# Patient Record
Sex: FEMALE | Race: WHITE | NOT HISPANIC OR LATINO | Employment: FULL TIME | URBAN - METROPOLITAN AREA
[De-identification: names, ages, dates, MRNs, and addresses within clinical notes are randomized per-mention and may not be internally consistent; named-entity substitution may affect disease eponyms.]

---

## 2018-03-03 ENCOUNTER — APPOINTMENT (OUTPATIENT)
Dept: RADIOLOGY | Facility: IMAGING CENTER | Age: 23
End: 2018-03-03
Attending: NURSE PRACTITIONER
Payer: COMMERCIAL

## 2018-03-03 ENCOUNTER — OFFICE VISIT (OUTPATIENT)
Dept: URGENT CARE | Facility: CLINIC | Age: 23
End: 2018-03-03
Payer: COMMERCIAL

## 2018-03-03 VITALS
HEIGHT: 64 IN | DIASTOLIC BLOOD PRESSURE: 70 MMHG | HEART RATE: 72 BPM | TEMPERATURE: 97.7 F | BODY MASS INDEX: 30.39 KG/M2 | SYSTOLIC BLOOD PRESSURE: 122 MMHG | RESPIRATION RATE: 16 BRPM | OXYGEN SATURATION: 96 % | WEIGHT: 178 LBS
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
## 2018-03-03 DIAGNOSIS — M54.2 CERVICAL PAIN (NECK): ICD-10-CM

## 2018-03-03 DIAGNOSIS — M54.9 UPPER BACK PAIN: ICD-10-CM

## 2018-03-03 DIAGNOSIS — M62.838 NECK MUSCLE SPASM: ICD-10-CM

## 2018-03-03 PROCEDURE — 99204 OFFICE O/P NEW MOD 45 MIN: CPT | Performed by: NURSE PRACTITIONER

## 2018-03-03 PROCEDURE — 72040 X-RAY EXAM NECK SPINE 2-3 VW: CPT | Mod: TC,FY | Performed by: NURSE PRACTITIONER

## 2018-03-03 RX ORDER — METHYLPREDNISOLONE 4 MG/1
4 TABLET ORAL DAILY
Qty: 1 KIT | Refills: 0 | Status: SHIPPED | OUTPATIENT
Start: 2018-03-03

## 2018-03-03 RX ORDER — CYCLOBENZAPRINE HCL 5 MG
5-10 TABLET ORAL 3 TIMES DAILY PRN
Qty: 30 TAB | Refills: 0 | Status: SHIPPED | OUTPATIENT
Start: 2018-03-03

## 2018-03-03 ASSESSMENT — ENCOUNTER SYMPTOMS
VOMITING: 0
SORE THROAT: 0
HEADACHES: 0
MYALGIAS: 0
DIZZINESS: 0
BACK PAIN: 1
NECK PAIN: 1
FEVER: 0
CHILLS: 0
EYE PAIN: 0
SHORTNESS OF BREATH: 0
PARESIS: 0
NAUSEA: 0
SYNCOPE: 0
NUMBNESS: 0
WEAKNESS: 0
PHOTOPHOBIA: 0

## 2018-03-03 ASSESSMENT — PATIENT HEALTH QUESTIONNAIRE - PHQ9: CLINICAL INTERPRETATION OF PHQ2 SCORE: 0

## 2018-03-03 ASSESSMENT — PAIN SCALES - GENERAL: PAINLEVEL: 5=MODERATE PAIN
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
## 2018-03-08 ENCOUNTER — OFFICE VISIT (OUTPATIENT)
Dept: URGENT CARE | Facility: CLINIC | Age: 23
End: 2018-03-08
Payer: COMMERCIAL

## 2018-03-08 VITALS
RESPIRATION RATE: 20 BRPM | OXYGEN SATURATION: 98 % | HEART RATE: 76 BPM | BODY MASS INDEX: 30.39 KG/M2 | WEIGHT: 178 LBS | SYSTOLIC BLOOD PRESSURE: 120 MMHG | TEMPERATURE: 98.8 F | HEIGHT: 64 IN | DIASTOLIC BLOOD PRESSURE: 80 MMHG

## 2018-03-08 DIAGNOSIS — S29.019A ACUTE THORACIC MYOFASCIAL STRAIN, INITIAL ENCOUNTER: ICD-10-CM
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
## 2018-03-08 PROCEDURE — 99214 OFFICE O/P EST MOD 30 MIN: CPT | Performed by: FAMILY MEDICINE

## 2018-03-08 RX ORDER — TRAMADOL HYDROCHLORIDE 50 MG/1
50 TABLET ORAL EVERY 8 HOURS PRN
Qty: 10 TAB | Refills: 0 | Status: SHIPPED | OUTPATIENT
Start: 2018-03-08 | End: 2018-03-13
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
## 2018-03-15 ENCOUNTER — PHYSICAL THERAPY (OUTPATIENT)
Dept: PHYSICAL THERAPY | Facility: REHABILITATION | Age: 23
End: 2018-03-15
Attending: FAMILY MEDICINE
Payer: COMMERCIAL
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
## 2018-03-15 DIAGNOSIS — M54.50 ACUTE MIDLINE LOW BACK PAIN WITHOUT SCIATICA: ICD-10-CM

## 2018-03-15 DIAGNOSIS — S29.019A ACUTE THORACIC MYOFASCIAL STRAIN, INITIAL ENCOUNTER: ICD-10-CM

## 2018-03-15 DIAGNOSIS — M54.2 NECK PAIN: ICD-10-CM

## 2018-03-15 PROCEDURE — 97162 PT EVAL MOD COMPLEX 30 MIN: CPT

## 2018-03-15 PROCEDURE — 97014 ELECTRIC STIMULATION THERAPY: CPT

## 2018-03-15 ASSESSMENT — ENCOUNTER SYMPTOMS
PAIN SCALE AT LOWEST: 2
PAIN TIMING: WHEN ACTIVE
QUALITY: BURNING
EXACERBATED BY: BENDING
PAIN TIMING: IN THE EVENING
QUALITY: ACHING
EXACERBATED BY: SITTING
PAIN SCALE: 2
PAIN LOCATION: NECK AND BACK
QUALITY: THROBBING
EXACERBATED BY: LIFTING
PAIN SCALE AT HIGHEST: 5
ALLEVIATING FACTORS: NOTHING
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
## 2018-03-29 ENCOUNTER — PHYSICAL THERAPY (OUTPATIENT)
Dept: PHYSICAL THERAPY | Facility: REHABILITATION | Age: 23
End: 2018-03-29
Attending: FAMILY MEDICINE
Payer: COMMERCIAL

## 2018-03-29 DIAGNOSIS — M54.2 NECK PAIN: ICD-10-CM
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
## 2018-03-29 DIAGNOSIS — S29.019A ACUTE THORACIC MYOFASCIAL STRAIN, INITIAL ENCOUNTER: ICD-10-CM

## 2018-03-29 PROCEDURE — 97140 MANUAL THERAPY 1/> REGIONS: CPT

## 2018-03-29 PROCEDURE — 97014 ELECTRIC STIMULATION THERAPY: CPT

## 2018-03-29 PROCEDURE — 97110 THERAPEUTIC EXERCISES: CPT

## 2018-03-29 NOTE — OP THERAPY DAILY TREATMENT
Outpatient Physical Therapy  DAILY TREATMENT     [facility] Physical [address]  [address]  [place] [address]  Phone:  [phone]  Fax:  [phone]    Date: 03/29/2018    Patient: Joshua Ansari  YOB: 1995  MRN: [id number]     Time Calculation  Start time: 1130  Stop time: 1215 Time Calculation (min): 45 minutes     Chief Complaint: Neck Pain    Visit #: 2    SUBJECTIVE:  My pain is mostly better    OBJECTIVE:          Therapeutic Exercises (CPT 66852):     1. Foam roller , pec stretch, alt GH flexion, seals, serratus press     2. Upper trap stretch , 30 sec on each side     3. Levator scap stretch , 30 sec     Therapeutic Treatments and Modalities:     1. Manual Therapy (CPT 95706), PA C5-C7, t/s mobs, manual traction, OA release     2. E Stim Unattended (CPT 54271), IFC and heat to c/s spine x 15 min    Time-based treatments/modalities:  Manual therapy minutes (CPT 84179): 10 minutes  Therapeutic exercise minutes (CPT 62310): 20 minutes       Pain rating before treatment: 1  Pain rating after treatment: 0    ASSESSMENT:   Response to treatment: Patient is reporting decreased overall pain   PLAN/RECOMMENDATIONS:   Plan for treatment: therapy treatment to continue next visit.  Planned interventions for next visit: continue with current treatment.  Add deep c/s strengthening.

## 2018-04-03 ENCOUNTER — PHYSICAL THERAPY (OUTPATIENT)
Dept: PHYSICAL THERAPY | Facility: REHABILITATION | Age: 23
End: 2018-04-03
Attending: FAMILY MEDICINE
Payer: COMMERCIAL
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
## 2018-04-03 DIAGNOSIS — M54.2 NECK PAIN: ICD-10-CM

## 2018-04-03 DIAGNOSIS — M54.50 ACUTE MIDLINE LOW BACK PAIN WITHOUT SCIATICA: ICD-10-CM

## 2018-04-03 DIAGNOSIS — S29.019A ACUTE THORACIC MYOFASCIAL STRAIN, INITIAL ENCOUNTER: ICD-10-CM

## 2018-04-03 PROCEDURE — 97110 THERAPEUTIC EXERCISES: CPT

## 2018-04-03 PROCEDURE — 97014 ELECTRIC STIMULATION THERAPY: CPT

## 2018-04-03 NOTE — OP THERAPY DAILY TREATMENT
Outpatient Physical Therapy  DAILY TREATMENT     [facility] Physical Therapy [address]  [address]  [place] [address]  Phone:  [phone]  Fax:  [phone]    Date: 04/03/2018    Patient: Joshua Ansari  YOB: 1995  MRN: [id number]     Time Calculation  Start time: 1456  Stop time: 1548 Time Calculation (min): 52 minutes     Chief Complaint: Back Problem    Visit #: 3    SUBJECTIVE:  Reports she has been feeling better after PT sessions.  Relief from last session lasted about 2 days.  Does try to stretch it out herself, which sometimes helps.  Today woke up achy and has worsened through the day with work activity.     OBJECTIVE:        Therapeutic Exercises (CPT 20719):     1. UBE, 4 min alternating.     2. Roller, diaphragm breath, alt GHJ flex, prot/ret, pec stretch, thread the needle.     3. Peanut ball review of self tx to upper t/s and lower c/s on wall and on table, x 10 min    4. Rows, L2 x 20    Therapeutic Treatments and Modalities:     2. E Stim Unattended (CPT 85792), IFC and heat to CTJ and upper thoracic spine x 15 min    Time-based treatments/modalities:  Therapeutic exercise minutes (CPT 18703): 30 minutes       Pain rating before treatment: 4  Pain rating after treatment: 2    ASSESSMENT:   Response to treatment: Good relief reported with self tx techniques.  She states preferring the peanut ball over the roller and plans to purchase some for home.  Considering TENS unit as well.     PLAN/RECOMMENDATIONS:   Plan for treatment: therapy treatment to continue next visit.  Planned interventions for next visit: continue with current treatment.

## 2018-04-05 ENCOUNTER — PHYSICAL THERAPY (OUTPATIENT)
Dept: PHYSICAL THERAPY | Facility: REHABILITATION | Age: 23
End: 2018-04-05
Attending: FAMILY MEDICINE
Payer: COMMERCIAL

## 2018-04-05 DIAGNOSIS — M54.50 ACUTE MIDLINE LOW BACK PAIN WITHOUT SCIATICA: ICD-10-CM

## 2018-04-05 DIAGNOSIS — M54.2 NECK PAIN: ICD-10-CM

## 2018-04-05 DIAGNOSIS — S29.019A ACUTE THORACIC MYOFASCIAL STRAIN, INITIAL ENCOUNTER: ICD-10-CM
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
## 2018-04-05 PROCEDURE — 97110 THERAPEUTIC EXERCISES: CPT

## 2018-04-05 NOTE — OP THERAPY DAILY TREATMENT
"  Outpatient Physical Therapy  DAILY TREATMENT     [facility] Physical [address].  [address]  [place] [address]  Phone:  [phone]  Fax:  [phone]    Date: 04/05/2018    Patient: Joshua Ansari  YOB: 1995  MRN: [id number]     Time Calculation  Start time: 1129  Stop time: 1200 Time Calculation (min): 31 minutes     Chief Complaint: Neck Problem and Back Problem    Visit #: 4    SUBJECTIVE:  The pain is \"a lot better\" after last session.  Has not gotten a peanut ball for home, but plans to.      OBJECTIVE:        Therapeutic Exercises (CPT 79222):     1. UBE, 4 min alternating.     2. Peanut ball review of self tx to upper t/s and lower c/s on wall and on table, x 10 min total    3. DNF lift off, 6c6\" holds    4. Prone kathrine, to fatigue x 2 sets, x13 and x 13    5. [place] with balloon c/s stab, L2 x 25      Therapeutic Exercise Summary: Declined IFC today, \"feeling pretty good\"      Time-based treatments/modalities:  Therapeutic exercise minutes (CPT 48109): 31 minutes       Pain rating before treatment: 1/10 L cervical.     ASSESSMENT:   Response to treatment: Pain levels low today with excellent response to self mobilizations.  Able to initiate stability/postural work.     PLAN/RECOMMENDATIONS:   Plan for treatment: therapy treatment to continue next visit.  Planned interventions for next visit: continue with current treatment.      "

## 2018-04-10 ENCOUNTER — PHYSICAL THERAPY (OUTPATIENT)
Dept: PHYSICAL THERAPY | Facility: REHABILITATION | Age: 23
End: 2018-04-10
Attending: FAMILY MEDICINE
Payer: COMMERCIAL

## 2018-04-10 DIAGNOSIS — M54.2 NECK PAIN: ICD-10-CM

## 2018-04-10 DIAGNOSIS — S29.019A ACUTE THORACIC MYOFASCIAL STRAIN, INITIAL ENCOUNTER: ICD-10-CM

## 2018-04-10 PROCEDURE — 97110 THERAPEUTIC EXERCISES: CPT

## 2018-04-10 NOTE — OP THERAPY DAILY TREATMENT
Outpatient Physical Therapy  DAILY TREATMENT     [facility] Physical Therapy [address].  [address]  [place] [address]  Phone:  [phone]  Fax:  [phone]    Date: 04/10/2018    Patient: Joshua Ansari  YOB: 1995  MRN: [id number]     Time Calculation  Start time: 1200  Stop time: 1230 Time Calculation (min): 30 minutes     Chief Complaint: Neck Problem    Visit #: 5    SUBJECTIVE:  I got a stim unit and I use it at home only occasionally as needed.     OBJECTIVE:      Therapeutic Exercises (CPT 24217):     1. [place] holds on wall, 10 sec x 10    2. [place] Lower trap wall slides , 15x    3. Horizontal abduction , L1 Band 15x    4. Tennis balls on wall with t/s ext above ball     5. Median nerve stretch , 5x    6. Supine pec stretch with diaghramtic breathing       Time-based treatments/modalities:  Therapeutic exercise minutes (CPT 73010): 28 minutes       Pain rating before treatment: 1  Pain rating after treatment: 1    ASSESSMENT:  Response to treatment: Patient reports she is doing much better overall.      PLAN/RECOMMENDATIONS:   Plan for treatment: therapy treatment to continue next visit.  Planned interventions for next visit: continue with current treatment.Follow up in 1 week to assess patient and determine plan of care.

## 2018-04-12 ENCOUNTER — APPOINTMENT (OUTPATIENT)
Dept: PHYSICAL THERAPY | Facility: REHABILITATION | Age: 23
End: 2018-04-12
Attending: FAMILY MEDICINE
Payer: COMMERCIAL

## 2018-04-17 ENCOUNTER — PHYSICAL THERAPY (OUTPATIENT)
Dept: PHYSICAL THERAPY | Facility: REHABILITATION | Age: 23
End: 2018-04-17
Attending: FAMILY MEDICINE
Payer: COMMERCIAL

## 2018-04-17 DIAGNOSIS — M54.2 NECK PAIN: ICD-10-CM

## 2018-04-17 DIAGNOSIS — S29.019A ACUTE THORACIC MYOFASCIAL STRAIN, INITIAL ENCOUNTER: ICD-10-CM

## 2018-04-17 PROCEDURE — 97110 THERAPEUTIC EXERCISES: CPT

## 2018-04-17 NOTE — OP THERAPY DAILY TREATMENT
Outpatient Physical Therapy  DAILY TREATMENT     [facility] Physical [address].  [address]  [place] [address]  Phone:  [phone]  Fax:  [phone]    Date: 04/17/2018    Patient: Joshua Ansari  YOB: 1995  MRN: [id number]     Time Calculation  Start time: 1500  Stop time: 1530 Time Calculation (min): 30 minutes     Chief Complaint: Neck Pain    Visit #: 6    SUBJECTIVE:  I am feeling better overall.     OBJECTIVE:  Current objective measures: Full AROM of cervical spine     Therapeutic Exercises (CPT 76254):     1. [place] holds on wall, 10 sec x 10    2. [place] Lower trap wall slides with L2 band , 15x 2     3. Horizontal abduction on half foam roller , L1 Band 15x    4. Tennis balls on wall with t/s ext above ball     5. Median nerve stretch , 5x    6. 1/2 foam roll stretch , pec stretch, alternating GH flexion, seals, serratus punch    7. UBE x 4 minutes , 2 min each direction      Time-based treatments/modalities:  Therapeutic exercise minutes (CPT 01118): 28 minutes       Pain rating before treatment: 0  Pain rating after treatment: 0    ASSESSMENT:   Response to treatment: patient reports she is managing her pain with TENS and exercises at home.     PLAN/RECOMMENDATIONS:   Plan for treatment: therapy treatment to continue next visit.  Planned interventions for next visit: continue with current treatment.Follow up if needed in 7-9 days.  If patient cancels appt. Then keep chart open x 30days.

## 2018-04-24 ENCOUNTER — PHYSICAL THERAPY (OUTPATIENT)
Dept: PHYSICAL THERAPY | Facility: REHABILITATION | Age: 23
End: 2018-04-24
Attending: FAMILY MEDICINE
Payer: COMMERCIAL

## 2018-04-24 DIAGNOSIS — S29.019A ACUTE THORACIC MYOFASCIAL STRAIN, INITIAL ENCOUNTER: ICD-10-CM

## 2018-04-24 DIAGNOSIS — M54.2 NECK PAIN: ICD-10-CM

## 2018-04-24 PROCEDURE — 97110 THERAPEUTIC EXERCISES: CPT

## 2018-04-24 NOTE — OP THERAPY DAILY TREATMENT
Outpatient Physical Therapy  DAILY TREATMENT     [facility] [address].  [address]  [place] [address]  Phone:  [phone]  Fax:  [phone]    Date: 04/24/2018    Patient: Joshua Ansari  YOB: 1995  MRN: [id number]     Time Calculation  Start time: 1500  Stop time: 1530 Time Calculation (min): 30 minutes     Chief Complaint: Neck Pain    Visit #: 7    SUBJECTIVE:  I am better overall, but I think I tweaked something. It was kind of bad on Friday, then went away over the weekend.      OBJECTIVE:    Therapeutic Exercises (CPT 73960):     1. Summerland holds on wall, 10 sec x 10    2. Maual traction     3. Pinkie ball     4. Tennis balls on wall with t/s ext above ball     5. Ist rib mob with sheet    6. Wall slides , 10x    7. UBE x 4 minutes , 2 min each direction    8. Trunk rotation with stick for t/s extension, 10x      Time-based treatments/modalities:    Manual therapy minutes (CPT 15101): 5 minutes  Therapeutic exercise minutes (CPT 93246): 25 minutes  Pain rating before treatment: 2  Pain rating after treatment: 0    ASSESSMENT:   Response to treatment: Patient reports decreased overall tightness and pain after treatment.     PLAN/RECOMMENDATIONS:   Plan for treatment: therapy treatment to continue next visit.  Planned interventions for next visit: continue with current treatment. Patient to follow up with appt in 2-3 weeks if needed.

## 2018-05-07 ENCOUNTER — TELEPHONE (OUTPATIENT)
Dept: PHYSICAL THERAPY | Facility: REHABILITATION | Age: 23
End: 2018-05-07

## 2018-05-07 NOTE — OP THERAPY DISCHARGE SUMMARY
Outpatient Physical Therapy  DISCHARGE SUMMARY NOTE      [facility] [address].  [address]  [place] [address]  Phone:  [phone]  Fax:  [phone]    Date of Visit: 05/07/2018    Patient: Joshua Ansari  YOB: 1995  MRN: [id number]     Referring Provider: Shereen Ag D.O.   Referring Diagnosis C/s pain and t/s pain     Physical Therapy Occurrence Codes    Date physical therapy care plan established or reviewed:  3/15/18   Date physical therapy treatment started:  3/15/18          Your patient is being discharged from Physical Therapy with the following comments:   · Goals met    Comments:  Patient reports she had very little neck pain and thoracic pain and was able to manage her symptoms independently.        Recommendations:  Patient has extensive HEP and has been very compliant.      Sherri Arboleda, PT, DPT    Date: 5/7/2018

## 2018-05-08 ENCOUNTER — APPOINTMENT (OUTPATIENT)
Dept: PHYSICAL THERAPY | Facility: REHABILITATION | Age: 23
End: 2018-05-08
Attending: FAMILY MEDICINE
Payer: COMMERCIAL

## 2018-10-17 ENCOUNTER — HOSPITAL ENCOUNTER (OUTPATIENT)
Dept: RADIOLOGY | Facility: MEDICAL CENTER | Age: 23
End: 2018-10-17
Attending: PHYSICIAN ASSISTANT
Payer: COMMERCIAL

## 2018-10-17 DIAGNOSIS — M25.512 LEFT SHOULDER PAIN, UNSPECIFIED CHRONICITY: ICD-10-CM

## 2018-10-17 PROCEDURE — 77002 NEEDLE LOCALIZATION BY XRAY: CPT | Mod: LT

## 2018-10-17 PROCEDURE — 700117 HCHG RX CONTRAST REV CODE 255: Performed by: PHYSICIAN ASSISTANT

## 2018-10-17 PROCEDURE — 73222 MRI JOINT UPR EXTREM W/DYE: CPT | Mod: LT

## 2018-10-17 PROCEDURE — A9585 GADOBUTROL INJECTION: HCPCS | Performed by: PHYSICIAN ASSISTANT

## 2018-10-17 PROCEDURE — 700101 HCHG RX REV CODE 250

## 2018-10-17 RX ORDER — GADOBUTROL 604.72 MG/ML
1 INJECTION INTRAVENOUS ONCE
Status: COMPLETED | OUTPATIENT
Start: 2018-10-17 | End: 2018-10-17

## 2018-10-17 RX ORDER — LIDOCAINE HYDROCHLORIDE 10 MG/ML
INJECTION, SOLUTION INFILTRATION; PERINEURAL
Status: DISPENSED
Start: 2018-10-17 | End: 2018-10-17

## 2018-10-17 RX ADMIN — IOHEXOL 2 ML: 300 INJECTION, SOLUTION INTRAVENOUS at 09:55

## 2018-10-17 RX ADMIN — GADOBUTROL 1 ML: 604.72 INJECTION INTRAVENOUS at 09:55

## 2018-11-06 ENCOUNTER — HOSPITAL ENCOUNTER (OUTPATIENT)
Facility: MEDICAL CENTER | Age: 23
End: 2018-11-06
Attending: OBSTETRICS & GYNECOLOGY
Payer: COMMERCIAL

## 2018-11-06 ENCOUNTER — GYNECOLOGY VISIT (OUTPATIENT)
Dept: OBGYN | Facility: MEDICAL CENTER | Age: 23
End: 2018-11-06
Payer: COMMERCIAL

## 2018-11-06 VITALS
BODY MASS INDEX: 28.68 KG/M2 | SYSTOLIC BLOOD PRESSURE: 114 MMHG | DIASTOLIC BLOOD PRESSURE: 70 MMHG | HEIGHT: 64 IN | WEIGHT: 168 LBS

## 2018-11-06 DIAGNOSIS — N93.9 ABNORMAL UTERINE BLEEDING: ICD-10-CM

## 2018-11-06 DIAGNOSIS — L68.0 HIRSUTISM: ICD-10-CM

## 2018-11-06 DIAGNOSIS — Z11.3 ROUTINE SCREENING FOR STI (SEXUALLY TRANSMITTED INFECTION): ICD-10-CM

## 2018-11-06 DIAGNOSIS — Z30.019 ENCOUNTER FOR INITIAL PRESCRIPTION OF CONTRACEPTIVES, UNSPECIFIED CONTRACEPTIVE: ICD-10-CM

## 2018-11-06 DIAGNOSIS — L70.9 ACNE, UNSPECIFIED ACNE TYPE: ICD-10-CM

## 2018-11-06 PROCEDURE — 87591 N.GONORRHOEAE DNA AMP PROB: CPT

## 2018-11-06 PROCEDURE — 87491 CHLMYD TRACH DNA AMP PROBE: CPT

## 2018-11-06 PROCEDURE — 99204 OFFICE O/P NEW MOD 45 MIN: CPT | Performed by: OBSTETRICS & GYNECOLOGY

## 2018-11-06 RX ORDER — CITALOPRAM 20 MG/1
20 TABLET ORAL DAILY
COMMUNITY
Start: 2018-10-26

## 2018-11-06 RX ORDER — TRAZODONE HYDROCHLORIDE 50 MG/1
25 TABLET ORAL
COMMUNITY
Start: 2018-09-26

## 2018-11-06 RX ORDER — ACETAMINOPHEN AND CODEINE PHOSPHATE 120; 12 MG/5ML; MG/5ML
1 SOLUTION ORAL DAILY
Qty: 28 TAB | Refills: 11 | Status: SHIPPED | OUTPATIENT
Start: 2018-11-06

## 2018-11-06 NOTE — PROGRESS NOTES
GYN Visit    CC: change in menstrual ccle    HPI: Joshua Ansari is a 23 y.o.  here to discuss her menstrual cycle; also reprots family hx of PCOS and would like to be evaluated for this.    Currently using condoms for contraception; has used ZAHRA in the past.  Reports hx of DVT in father (related to surgery, unsure if inherited thrombophilia workup was neg, no lifelong anticoagulation)    Pt reports she has always had painful periods, crampy; used ot start a week before menses and not too bad.  Reports her cramping now starts day of period and is very painful. Tries to take midol but doesn't help, heating pad helps some.  Cannot take ibuprofen (swelling) but OK with naproxen.    Feels like her flow has changed, lighter than used to be but is darker in color and some clots.  Max 4 pads or tampons or day.   This change started /July    Patient's last menstrual period was 10/13/2018 (exact date).  Last period had spotting about 1 week after menses which lasted a week. Only pain with bleeding, no pain when not having vaginal bleeding.  This is the first time this has ever happened, never had intermenstrual bleeding or skipped menses in the past.  Did take a UPT which was neg.      ROS:  constitutional: denies fevers, general concerns  CV: denies chest pain, palpitations, edema  Resp: denies shortness of breath, cough  GI: denies abd pain, N/V, diarrhea/constipation, blood in stool  : denies irregular vaginal bleeding, discharge, pain, denies urinary complaints  Neuro: +hx of migraine  Endo: denies significant weight changes, irregular menses; +hair growth on skin for which she shaves/waxes, always had this.  Breast: denies nipple discharge, milk production  Psych: reports no concerns about mood, feels well  Skin: +acne    OB History    Para Term  AB Living   0 0 0 0 0 0   SAB TAB Ectopic Molar Multiple Live Births   0 0 0 0 0 0             GYN Hx:   15/monthly/6d  Denies hx of STIs; 1  "partner  Denies hx of abnl paps, last pap reportedly normal last yr    Past Medical History:   Diagnosis Date   • Asthma    • Migraine    • Ulcer mouth        PSHx: wrist surgery    Medications:   Current Outpatient Prescriptions Ordered in [facility]   Medication Sig Dispense Refill   • traZODone (DESYREL) 50 MG Tab Take 25 mg by mouth 1 time daily as needed.     • citalopram (CELEXA) 20 MG Tab Take 20 mg by mouth every day.     Naproxen/midol PRN    Allergies: Asa [aspirin]; Codeine; Ibuprofen; and Respinol [ami-jennifer]    Social History     Social History   • Marital status: Single     Spouse name: N/A   • Number of children: N/A   • Years of education: N/A     Social History Main Topics   • Smoking status: Never Smoker   • Smokeless tobacco: Never Used   • Alcohol use 0.6 oz/week     1 Glasses of wine per week      Comment: social    • Drug use: No   • Sexual activity: Yes     Partners: Male     Birth control/ protection: Condom       Family History   Problem Relation Age of Onset   • Other Mother         mental illness   • Diabetes Mother    • Thyroid Mother    • Other Father         Mental illness   • Diabetes Father      Ovarian ca in MGM (recently, in older age)  Father w/ hx of DVT, not on lifelong anticoagulation; likely asst'd with surger  Mom PCOS/DM    Physical Exam:  /70 (BP Location: Left arm, Patient Position: Sitting)   Ht 1.626 m (5' 4\")   Wt 76.2 kg (168 lb)   LMP 10/13/2018 (Exact Date)   Breastfeeding? No   BMI 28.84 kg/m²   gen: AAO, NAD,   Psych: mood and affect appropriate  CV: RRR; no LE edema  Neck: supple; thyroid normal, no masses  resp: ctab  abd: soft, NT, ND, no masses, no hepatosplenomegaly, no hernias  : NEFG, normal urethral meatus, normal anus/perineum, normal vagina and cervix.  Uterus small sized, modiposition, no adnexal masses/tenderness  Skin: warm/dry, +acne on face; +stubble noted on chin    A/P: 23 y.o.  with change in menstrual cycle, acne and hirsutism  1. " Abnormal uterine bleeding  CHLAMYDIA/GC PCR URINE OR SWAB    17-OH PROGESTERONE    PROLACTIN    TSH    TESTOSTERONE F&T FEMALES/CHILD    DHEA SULFATE    US-PELVIC TRANSVAGINAL ONLY    CBC WITHOUT DIFFERENTIAL   2. Routine screening for STI (sexually transmitted infection)  HIV AG/AB COMBO ASSAY SCREENING    RPR (SYPHILIS)    HEP B SURFACE ANTIGEN    HEP C VIRUS ANTIBODY   3. Hirsutism     4. Acne, unspecified acne type          - 1 episode of intermenstrual spotting - pt does report she has been stressed and may be associated with this; also has signs of increased androgens and desires evaluation for PCOS which is not unreasonable.  Will defer EMB at this time given hx of regular menses but initiate laboratory workup for PCOS as well as US. GC/Ct done as well  - increased menses with cramping: will get US/check CBC.  Discussed scheduled naproxen as well as contraceptive options to alleviate  - record release today for last yr pap result  - discussed contraceptive options including combined hormonal contraceptives (COCs, nuvaring, patch), progestin only pills, IUD (levonorgestrel and copper), nexplanon and Depo-medroxyprogesterone acetate.  After our discussion, pt desires POP for now - discussed to expect irregular spotting with this; considering mirena.  Will ask for further family hx regarding DVT as if father tested and + would need inherited thrombophilia workup, if father tested and neg would be reassuring.  Discussed result could have implications for pregnancy as well.    - accepting of full STI screen    Return in approx 1 month after US/labwork for discussion of results      Radha Bhatti MD  [facility], Women's Health

## 2018-11-06 NOTE — NON-PROVIDER
Pt states that she has had spotting in between periods and more painful cramps and her color flow has changed and it is darker than usual  Good #[phone]  Pharmacy verified.

## 2018-11-07 DIAGNOSIS — N93.9 ABNORMAL UTERINE BLEEDING: ICD-10-CM

## 2018-11-07 LAB
C TRACH DNA SPEC QL NAA+PROBE: NEGATIVE
N GONORRHOEA DNA SPEC QL NAA+PROBE: NEGATIVE
SPECIMEN SOURCE: NORMAL

## 2018-11-16 ENCOUNTER — HOSPITAL ENCOUNTER (OUTPATIENT)
Dept: RADIOLOGY | Facility: MEDICAL CENTER | Age: 23
End: 2018-11-16
Attending: OBSTETRICS & GYNECOLOGY
Payer: COMMERCIAL

## 2018-11-16 DIAGNOSIS — N93.9 ABNORMAL UTERINE BLEEDING: ICD-10-CM

## 2018-11-16 PROCEDURE — 76830 TRANSVAGINAL US NON-OB: CPT

## 2018-11-19 DIAGNOSIS — N83.202 LEFT OVARIAN CYST: ICD-10-CM

## 2018-11-20 ENCOUNTER — HOSPITAL ENCOUNTER (OUTPATIENT)
Dept: LAB | Facility: MEDICAL CENTER | Age: 23
End: 2018-11-20
Attending: OBSTETRICS & GYNECOLOGY
Payer: COMMERCIAL

## 2018-11-20 DIAGNOSIS — Z11.3 ROUTINE SCREENING FOR STI (SEXUALLY TRANSMITTED INFECTION): ICD-10-CM

## 2018-11-20 DIAGNOSIS — N93.9 ABNORMAL UTERINE BLEEDING: ICD-10-CM

## 2018-11-20 LAB
DHEA-S SERPL-MCNC: 215.2 UG/DL (ref 98.8–340)
ERYTHROCYTE [DISTWIDTH] IN BLOOD BY AUTOMATED COUNT: 47.7 FL (ref 35.9–50)
HBV SURFACE AG SER QL: NEGATIVE
HCT VFR BLD AUTO: 40.7 % (ref 37–47)
HCV AB SER QL: NEGATIVE
HGB BLD-MCNC: 13.4 G/DL (ref 12–16)
HIV 1+2 AB+HIV1 P24 AG SERPL QL IA: NON REACTIVE
MCH RBC QN AUTO: 31.5 PG (ref 27–33)
MCHC RBC AUTO-ENTMCNC: 32.9 G/DL (ref 33.6–35)
MCV RBC AUTO: 95.5 FL (ref 81.4–97.8)
PLATELET # BLD AUTO: 209 K/UL (ref 164–446)
PMV BLD AUTO: 11.1 FL (ref 9–12.9)
PROLACTIN SERPL-MCNC: 21.61 NG/ML (ref 2.8–26)
RBC # BLD AUTO: 4.26 M/UL (ref 4.2–5.4)
TREPONEMA PALLIDUM IGG+IGM AB [PRESENCE] IN SERUM OR PLASMA BY IMMUNOASSAY: NON REACTIVE
TSH SERPL DL<=0.005 MIU/L-ACNC: 1.85 UIU/ML (ref 0.38–5.33)
WBC # BLD AUTO: 11.2 K/UL (ref 4.8–10.8)

## 2018-11-20 PROCEDURE — 36415 COLL VENOUS BLD VENIPUNCTURE: CPT

## 2018-11-20 PROCEDURE — 84146 ASSAY OF PROLACTIN: CPT

## 2018-11-20 PROCEDURE — 87340 HEPATITIS B SURFACE AG IA: CPT

## 2018-11-20 PROCEDURE — 86803 HEPATITIS C AB TEST: CPT

## 2018-11-20 PROCEDURE — 84403 ASSAY OF TOTAL TESTOSTERONE: CPT

## 2018-11-20 PROCEDURE — 83498 ASY HYDROXYPROGESTERONE 17-D: CPT

## 2018-11-20 PROCEDURE — 85027 COMPLETE CBC AUTOMATED: CPT

## 2018-11-20 PROCEDURE — 82627 DEHYDROEPIANDROSTERONE: CPT

## 2018-11-20 PROCEDURE — 84443 ASSAY THYROID STIM HORMONE: CPT

## 2018-11-20 PROCEDURE — 86780 TREPONEMA PALLIDUM: CPT

## 2018-11-20 PROCEDURE — 87389 HIV-1 AG W/HIV-1&-2 AB AG IA: CPT

## 2018-11-20 PROCEDURE — 84270 ASSAY OF SEX HORMONE GLOBUL: CPT

## 2018-11-25 LAB
SHBG SERPL-SCNC: 87 NMOL/L (ref 30–135)
TESTOST FREE SERPL-MCNC: 1.4 PG/ML (ref 0.8–7.4)
TESTOST SERPL-MCNC: 16 NG/DL (ref 9–55)

## 2018-11-26 LAB — 17OHP SERPL-MCNC: 26.9 NG/DL

## 2019-12-19 ENCOUNTER — APPOINTMENT (OUTPATIENT)
Dept: RADIOLOGY | Facility: IMAGING CENTER | Age: 24
End: 2019-12-19
Attending: NURSE PRACTITIONER
Payer: COMMERCIAL

## 2019-12-19 ENCOUNTER — OFFICE VISIT (OUTPATIENT)
Dept: URGENT CARE | Facility: CLINIC | Age: 24
End: 2019-12-19
Payer: COMMERCIAL

## 2019-12-19 VITALS
RESPIRATION RATE: 16 BRPM | TEMPERATURE: 98.2 F | WEIGHT: 185 LBS | DIASTOLIC BLOOD PRESSURE: 74 MMHG | HEART RATE: 74 BPM | BODY MASS INDEX: 30.82 KG/M2 | SYSTOLIC BLOOD PRESSURE: 106 MMHG | HEIGHT: 65 IN | OXYGEN SATURATION: 98 %

## 2019-12-19 DIAGNOSIS — S46.912A SHOULDER STRAIN, LEFT, INITIAL ENCOUNTER: ICD-10-CM

## 2019-12-19 DIAGNOSIS — M25.512 ACUTE PAIN OF LEFT SHOULDER: ICD-10-CM

## 2019-12-19 DIAGNOSIS — M62.838 MUSCLE SPASM OF LEFT SHOULDER: ICD-10-CM

## 2019-12-19 PROCEDURE — 99214 OFFICE O/P EST MOD 30 MIN: CPT | Performed by: NURSE PRACTITIONER

## 2019-12-19 PROCEDURE — 73030 X-RAY EXAM OF SHOULDER: CPT | Mod: TC,LT | Performed by: NURSE PRACTITIONER

## 2019-12-19 RX ORDER — CYCLOBENZAPRINE HCL 5 MG
5-10 TABLET ORAL 3 TIMES DAILY PRN
Qty: 30 TAB | Refills: 0 | Status: SHIPPED | OUTPATIENT
Start: 2019-12-19

## 2019-12-19 RX ORDER — LAMOTRIGINE 25 MG/1
25 TABLET ORAL DAILY
COMMUNITY
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
## 2019-12-20 NOTE — PATIENT INSTRUCTIONS
Muscle Strain  A muscle strain is an injury that occurs when a muscle is stretched beyond its normal length. Usually a small number of muscle fibers are torn when this happens. Muscle strain is rated in degrees. First-degree strains have the least amount of muscle fiber tearing and pain. Second-degree and third-degree strains have increasingly more tearing and pain.  Usually, recovery from muscle strain takes 1-2 weeks. Complete healing takes 5-6 weeks.  What are the causes?  Muscle strain happens when a sudden, violent force placed on a muscle stretches it too far. This may occur with lifting, sports, or a fall.  What increases the risk?  Muscle strain is especially common in athletes.  What are the signs or symptoms?  At the site of the muscle strain, there may be:  · Pain.  · Bruising.  · Swelling.  · Difficulty using the muscle due to pain or lack of normal function.  How is this diagnosed?  Your health care provider will perform a physical exam and ask about your medical history.  How is this treated?  Often, the best treatment for a muscle strain is resting, icing, and applying cold compresses to the injured area.  Follow these instructions at home:  · Use the PRICE method of treatment to promote muscle healing during the first 2-3 days after your injury. The PRICE method involves:  ¨ Protecting the muscle from being injured again.  ¨ Restricting your activity and resting the injured body part.  ¨ Icing your injury. To do this, put ice in a plastic bag. Place a towel between your skin and the bag. Then, apply the ice and leave it on from 15-20 minutes each hour. After the third day, switch to moist heat packs.  ¨ Apply compression to the injured area with a splint or elastic bandage. Be careful not to wrap it too tightly. This may interfere with blood circulation or increase swelling.  ¨ Elevate the injured body part above the level of your heart as often as you can.  · Only take over-the-counter or  prescription medicines for pain, discomfort, or fever as directed by your health care provider.  · Warming up prior to exercise helps to prevent future muscle strains.  Contact a health care provider if:  · You have increasing pain or swelling in the injured area.  · You have numbness, tingling, or a significant loss of strength in the injured area.  This information is not intended to replace advice given to you by your health care provider. Make sure you discuss any questions you have with your health care provider.  Document Released: 12/18/2006 Document Revised: 05/25/2017 Document Reviewed: 07/17/2014  [employer] Interactive Patient Education © 2017 [employer]    Shoulder Pain  Many things can cause shoulder pain, including:  · An injury to the area.  · Overuse of the shoulder.  · Arthritis.  The source of the pain can be:  · Inflammation.  · An injury to the shoulder joint.  · An injury to a tendon, ligament, or bone.  Follow these instructions at home:  Take these actions to help with your pain:  · Squeeze a soft ball or a foam pad as much as possible. This helps to keep the shoulder from swelling. It also helps to strengthen the arm.  · Take over-the-counter and prescription medicines only as told by your health care provider.  · If directed, apply ice to the area:  ¨ Put ice in a plastic bag.  ¨ Place a towel between your skin and the bag.  ¨ Leave the ice on for 20 minutes, 2-3 times per day. Stop applying ice if it does not help with the pain.  · If you were given a shoulder sling or immobilizer:  ¨ Wear it as told.  ¨ Remove it to shower or bathe.  ¨ Move your arm as little as possible, but keep your hand moving to prevent swelling.  Contact a health care provider if:  · Your pain gets worse.  · Your pain is not relieved with medicines.  · New pain develops in your arm, hand, or fingers.  Get help right away if:  · Your arm, hand, or fingers:  ¨ Tingle.  ¨ Become numb.  ¨ Become swollen.  ¨ Become  painful.  ¨ Turn white or blue.  This information is not intended to replace advice given to you by your health care provider. Make sure you discuss any questions you have with your health care provider.  Document Released: 09/27/2006 Document Revised: 08/13/2017 Document Reviewed: 04/11/2016  [employer] Interactive Patient Education © 2017 [employer]

## 2019-12-23 ASSESSMENT — ENCOUNTER SYMPTOMS
SENSORY CHANGE: 0
FALLS: 0
JOINT SWELLING: 0

## 2024-06-11 ENCOUNTER — RESEARCH ENCOUNTER (OUTPATIENT)
Dept: RESEARCH | Facility: MEDICAL CENTER | Age: 29
End: 2024-06-11